# Patient Record
(demographics unavailable — no encounter records)

---

## 2024-12-05 NOTE — DISCUSSION/SUMMARY
[EKG obtained to assist in diagnosis and management of assessed problem(s)] : EKG obtained to assist in diagnosis and management of assessed problem(s) [FreeTextEntry1] : 50 year old F with PMHx of HTN, DIANNA (using CPAP) , HLD and elevated Lp(a) who presents for presurgical evaluation today.  HTN: - Controlled - EKG today: NSR  - Continue BP control with Amlodipine 10 mg daily and Losartan-HCTZ 100-25 mg daily  - recent labs w/ PCP this summer revealed normal lytes and renal fx   HLD; elevated Lp(a): June 2024:  TG 76 HDL 59 LDL 97 (LDL was 152 prior to starting statin)  - c/w Crestor 5mg qd  - Spoke with patient about elevated Lpa result and what it means in relation to ASCVD. Explained the need for screening first degree relatives for Lpa and for getting LDL to very low level. Also explained that we will reach out if we have a trial for Lpa lowering and that in several years there may be therapies targeting Lpa on the market. - Coronary artery calcium (CAC) ordered for further risk stratification. Based on all test results, we will decide on an appropriate preventive treatment regimen for the patient. - Encouraged patient to continue healthy exercise and eating habits, focusing on a Mediterranean style of eating and aiming for the recommended 150 minutes per week of moderate physical activity   Pt. to RTC after recovered from surgery and done CAC for continued optimization.

## 2024-12-05 NOTE — PHYSICAL EXAM
[Normal Conjunctiva] : normal conjunctiva [No Carotid Bruit] : no carotid bruit [Normal S1, S2] : normal S1, S2 [No Murmur] : no murmur [Normal Gait] : normal gait [No Edema] : no edema [Normal PT B/L] : normal PT B/L [Normal] : alert and oriented, normal memory [Normal DP B/L] : normal DP B/L [No Rash] : no rash

## 2024-12-10 NOTE — HISTORY OF PRESENT ILLNESS
[Patient reported PAP Smear was abnormal] : Patient reported PAP Smear was abnormal [Y] : Positive pregnancy history [PapSmeardate] : 07/31/2024 [PGxTotal] : 1 [[facility] [FreeTextEntry1] : 11/23/2024 [Currently Active] : currently active [Men] : men [No] : No [Yes] : Yes [Condoms] : Condoms

## 2024-12-31 NOTE — HISTORY OF PRESENT ILLNESS
[FreeTextEntry1] : 50 year old presents for postoperative visit s/p  LAPAROSCOPIC SURACERVICAL HYSTERECTOMY BILATERAL SALPINGECTOMY on 12/16/24. She reports regular voiding and bowel movements, denies fevers/chills/dysuria.  Not taking pain medication.

## 2024-12-31 NOTE — DISCUSSION/SUMMARY
[FreeTextEntry1] : 50 year old s/p   s/p  LAPAROSCOPIC SURACERVICAL HYSTERECTOMY BILATERAL SALPINGECTOMY on 12/16/24 presents for postoperative visit doing well  - pathology and operative report pending  -continue pelvic rest with modified activity  -follow up in 4 weeks -f/u urine culture

## 2025-01-28 NOTE — DISCUSSION/SUMMARY
[FreeTextEntry1] : 50 year old s/p LAPAROSCOPIC SURACERVICAL HYSTERECTOMY BILATERAL SALPINGECTOMY on 12/16/24. presents for postoperative visit doing well  -resume all activity without limitation  -resume routine GYN care

## 2025-01-28 NOTE — PHYSICAL EXAM
[Chaperone Present] : A chaperone was present in the examining room during all aspects of the physical examination [Appropriately responsive] : appropriately responsive [Alert] : alert [No Acute Distress] : no acute distress [Soft] : soft [Non-tender] : non-tender [Non-distended] : non-distended [Oriented x3] : oriented x3 [FreeTextEntry7] : incision clean, dry, intact  [Labia Majora] : normal [Labia Minora] : normal [Normal] : normal [Absent] : absent [Uterine Adnexae] : normal
